# Patient Record
Sex: FEMALE | Race: WHITE | Employment: UNEMPLOYED | URBAN - METROPOLITAN AREA
[De-identification: names, ages, dates, MRNs, and addresses within clinical notes are randomized per-mention and may not be internally consistent; named-entity substitution may affect disease eponyms.]

---

## 2018-02-12 ENCOUNTER — APPOINTMENT (OUTPATIENT)
Dept: GENERAL RADIOLOGY | Age: 83
DRG: 292 | End: 2018-02-12
Payer: MEDICARE

## 2018-02-12 ENCOUNTER — HOSPITAL ENCOUNTER (INPATIENT)
Age: 83
LOS: 2 days | Discharge: HOME | DRG: 292 | End: 2018-02-14
Attending: INTERNAL MEDICINE | Admitting: INTERNAL MEDICINE
Payer: MEDICARE

## 2018-02-12 DIAGNOSIS — R09.02 HYPOXIA: ICD-10-CM

## 2018-02-12 DIAGNOSIS — I50.9 ACUTE CONGESTIVE HEART FAILURE, UNSPECIFIED CONGESTIVE HEART FAILURE TYPE: Primary | ICD-10-CM

## 2018-02-12 PROBLEM — J44.1 COPD WITH ACUTE EXACERBATION (HCC): Status: ACTIVE | Noted: 2018-02-12

## 2018-02-12 PROBLEM — I50.31 ACUTE DIASTOLIC CONGESTIVE HEART FAILURE (HCC): Status: ACTIVE | Noted: 2018-02-12

## 2018-02-12 LAB
ALBUMIN SERPL-MCNC: 3.5 G/DL (ref 3.9–4.9)
ALP BLD-CCNC: 70 U/L (ref 40–130)
ALT SERPL-CCNC: 14 U/L (ref 0–33)
ANION GAP SERPL CALCULATED.3IONS-SCNC: 14 MEQ/L (ref 7–13)
AST SERPL-CCNC: 18 U/L (ref 0–35)
BASOPHILS ABSOLUTE: 0.1 K/UL (ref 0–0.2)
BASOPHILS RELATIVE PERCENT: 0.7 %
BILIRUB SERPL-MCNC: 0.4 MG/DL (ref 0–1.2)
BUN BLDV-MCNC: 26 MG/DL (ref 8–23)
CALCIUM SERPL-MCNC: 9.2 MG/DL (ref 8.6–10.2)
CHLORIDE BLD-SCNC: 97 MEQ/L (ref 98–107)
CO2: 21 MEQ/L (ref 22–29)
CREAT SERPL-MCNC: 1.16 MG/DL (ref 0.5–0.9)
EKG ATRIAL RATE: 73 BPM
EKG P AXIS: 47 DEGREES
EKG P-R INTERVAL: 176 MS
EKG Q-T INTERVAL: 434 MS
EKG QRS DURATION: 132 MS
EKG QTC CALCULATION (BAZETT): 478 MS
EKG R AXIS: 39 DEGREES
EKG T AXIS: 62 DEGREES
EKG VENTRICULAR RATE: 73 BPM
EOSINOPHILS ABSOLUTE: 0.1 K/UL (ref 0–0.7)
EOSINOPHILS RELATIVE PERCENT: 0.9 %
GFR AFRICAN AMERICAN: 52.4
GFR NON-AFRICAN AMERICAN: 43.3
GLOBULIN: 3.1 G/DL (ref 2.3–3.5)
GLUCOSE BLD-MCNC: 118 MG/DL (ref 74–109)
HCT VFR BLD CALC: 40.8 % (ref 37–47)
HEMOGLOBIN: 13.3 G/DL (ref 12–16)
LACTIC ACID: 1.2 MMOL/L (ref 0.5–2.2)
LYMPHOCYTES ABSOLUTE: 1.5 K/UL (ref 1–4.8)
LYMPHOCYTES RELATIVE PERCENT: 19.3 %
MCH RBC QN AUTO: 29.9 PG (ref 27–31.3)
MCHC RBC AUTO-ENTMCNC: 32.5 % (ref 33–37)
MCV RBC AUTO: 92 FL (ref 82–100)
MONOCYTES ABSOLUTE: 0.9 K/UL (ref 0.2–0.8)
MONOCYTES RELATIVE PERCENT: 11.7 %
NEUTROPHILS ABSOLUTE: 5.3 K/UL (ref 1.4–6.5)
NEUTROPHILS RELATIVE PERCENT: 67.4 %
PDW BLD-RTO: 13.5 % (ref 11.5–14.5)
PLATELET # BLD: 248 K/UL (ref 130–400)
POTASSIUM SERPL-SCNC: 5.2 MEQ/L (ref 3.5–5.1)
PRO-BNP: NORMAL PG/ML
RAPID INFLUENZA  B AGN: NEGATIVE
RAPID INFLUENZA A AGN: NEGATIVE
RBC # BLD: 4.44 M/UL (ref 4.2–5.4)
SODIUM BLD-SCNC: 132 MEQ/L (ref 132–144)
TOTAL CK: 36 U/L (ref 0–170)
TOTAL PROTEIN: 6.6 G/DL (ref 6.4–8.1)
TROPONIN: <0.01 NG/ML (ref 0–0.01)
WBC # BLD: 7.9 K/UL (ref 4.8–10.8)

## 2018-02-12 PROCEDURE — 83605 ASSAY OF LACTIC ACID: CPT

## 2018-02-12 PROCEDURE — 99285 EMERGENCY DEPT VISIT HI MDM: CPT

## 2018-02-12 PROCEDURE — 71045 X-RAY EXAM CHEST 1 VIEW: CPT

## 2018-02-12 PROCEDURE — 6360000002 HC RX W HCPCS: Performed by: INTERNAL MEDICINE

## 2018-02-12 PROCEDURE — 6360000002 HC RX W HCPCS: Performed by: NURSE PRACTITIONER

## 2018-02-12 PROCEDURE — 93005 ELECTROCARDIOGRAM TRACING: CPT

## 2018-02-12 PROCEDURE — 87086 URINE CULTURE/COLONY COUNT: CPT

## 2018-02-12 PROCEDURE — 86403 PARTICLE AGGLUT ANTBDY SCRN: CPT

## 2018-02-12 PROCEDURE — 87040 BLOOD CULTURE FOR BACTERIA: CPT

## 2018-02-12 PROCEDURE — 2580000003 HC RX 258: Performed by: INTERNAL MEDICINE

## 2018-02-12 PROCEDURE — 94664 DEMO&/EVAL PT USE INHALER: CPT

## 2018-02-12 PROCEDURE — 80053 COMPREHEN METABOLIC PANEL: CPT

## 2018-02-12 PROCEDURE — 82550 ASSAY OF CK (CPK): CPT

## 2018-02-12 PROCEDURE — 84484 ASSAY OF TROPONIN QUANT: CPT

## 2018-02-12 PROCEDURE — 6370000000 HC RX 637 (ALT 250 FOR IP): Performed by: INTERNAL MEDICINE

## 2018-02-12 PROCEDURE — 36415 COLL VENOUS BLD VENIPUNCTURE: CPT

## 2018-02-12 PROCEDURE — 85025 COMPLETE CBC W/AUTO DIFF WBC: CPT

## 2018-02-12 PROCEDURE — 96374 THER/PROPH/DIAG INJ IV PUSH: CPT

## 2018-02-12 PROCEDURE — 83880 ASSAY OF NATRIURETIC PEPTIDE: CPT

## 2018-02-12 PROCEDURE — 1210000000 HC MED SURG R&B

## 2018-02-12 RX ORDER — LATANOPROST 50 UG/ML
1 SOLUTION/ DROPS OPHTHALMIC NIGHTLY
COMMUNITY

## 2018-02-12 RX ORDER — BRIMONIDINE TARTRATE 2 MG/ML
1 SOLUTION/ DROPS OPHTHALMIC 2 TIMES DAILY
COMMUNITY

## 2018-02-12 RX ORDER — FAMOTIDINE 20 MG/1
20 TABLET, FILM COATED ORAL DAILY
Status: DISCONTINUED | OUTPATIENT
Start: 2018-02-12 | End: 2018-02-14 | Stop reason: HOSPADM

## 2018-02-12 RX ORDER — METHYLPREDNISOLONE SODIUM SUCCINATE 40 MG/ML
10 INJECTION, POWDER, LYOPHILIZED, FOR SOLUTION INTRAMUSCULAR; INTRAVENOUS EVERY 8 HOURS
Status: DISCONTINUED | OUTPATIENT
Start: 2018-02-12 | End: 2018-02-13

## 2018-02-12 RX ORDER — SODIUM CHLORIDE 0.9 % (FLUSH) 0.9 %
10 SYRINGE (ML) INJECTION PRN
Status: DISCONTINUED | OUTPATIENT
Start: 2018-02-12 | End: 2018-02-14 | Stop reason: HOSPADM

## 2018-02-12 RX ORDER — LISINOPRIL 10 MG/1
10 TABLET ORAL DAILY
COMMUNITY
End: 2018-04-10 | Stop reason: SDUPTHER

## 2018-02-12 RX ORDER — IPRATROPIUM BROMIDE AND ALBUTEROL SULFATE 2.5; .5 MG/3ML; MG/3ML
1 SOLUTION RESPIRATORY (INHALATION) 3 TIMES DAILY
Status: DISCONTINUED | OUTPATIENT
Start: 2018-02-12 | End: 2018-02-12

## 2018-02-12 RX ORDER — ALBUTEROL SULFATE 2.5 MG/3ML
2.5 SOLUTION RESPIRATORY (INHALATION)
Status: DISCONTINUED | OUTPATIENT
Start: 2018-02-12 | End: 2018-02-14 | Stop reason: HOSPADM

## 2018-02-12 RX ORDER — CARVEDILOL 12.5 MG/1
12.5 TABLET ORAL 2 TIMES DAILY WITH MEALS
COMMUNITY
End: 2018-04-10 | Stop reason: SDUPTHER

## 2018-02-12 RX ORDER — ACETAMINOPHEN 325 MG/1
650 TABLET ORAL EVERY 4 HOURS PRN
Status: DISCONTINUED | OUTPATIENT
Start: 2018-02-12 | End: 2018-02-14 | Stop reason: HOSPADM

## 2018-02-12 RX ORDER — DOCUSATE SODIUM 100 MG/1
100 CAPSULE, LIQUID FILLED ORAL 2 TIMES DAILY
Status: DISCONTINUED | OUTPATIENT
Start: 2018-02-12 | End: 2018-02-14 | Stop reason: HOSPADM

## 2018-02-12 RX ORDER — CARVEDILOL 12.5 MG/1
12.5 TABLET ORAL 2 TIMES DAILY WITH MEALS
Status: DISCONTINUED | OUTPATIENT
Start: 2018-02-12 | End: 2018-02-14 | Stop reason: HOSPADM

## 2018-02-12 RX ORDER — FUROSEMIDE 10 MG/ML
20 INJECTION INTRAMUSCULAR; INTRAVENOUS 2 TIMES DAILY
Status: DISCONTINUED | OUTPATIENT
Start: 2018-02-12 | End: 2018-02-13

## 2018-02-12 RX ORDER — LISINOPRIL 10 MG/1
10 TABLET ORAL DAILY
Status: DISCONTINUED | OUTPATIENT
Start: 2018-02-12 | End: 2018-02-14 | Stop reason: HOSPADM

## 2018-02-12 RX ORDER — BRIMONIDINE TARTRATE 2 MG/ML
1 SOLUTION/ DROPS OPHTHALMIC 2 TIMES DAILY
Status: DISCONTINUED | OUTPATIENT
Start: 2018-02-12 | End: 2018-02-14 | Stop reason: HOSPADM

## 2018-02-12 RX ORDER — IPRATROPIUM BROMIDE AND ALBUTEROL SULFATE 2.5; .5 MG/3ML; MG/3ML
1 SOLUTION RESPIRATORY (INHALATION) EVERY 4 HOURS PRN
Status: DISCONTINUED | OUTPATIENT
Start: 2018-02-12 | End: 2018-02-14 | Stop reason: HOSPADM

## 2018-02-12 RX ORDER — SODIUM CHLORIDE 0.9 % (FLUSH) 0.9 %
10 SYRINGE (ML) INJECTION EVERY 12 HOURS SCHEDULED
Status: DISCONTINUED | OUTPATIENT
Start: 2018-02-12 | End: 2018-02-14 | Stop reason: HOSPADM

## 2018-02-12 RX ORDER — ONDANSETRON 2 MG/ML
4 INJECTION INTRAMUSCULAR; INTRAVENOUS EVERY 6 HOURS PRN
Status: DISCONTINUED | OUTPATIENT
Start: 2018-02-12 | End: 2018-02-14 | Stop reason: HOSPADM

## 2018-02-12 RX ORDER — LATANOPROST 50 UG/ML
1 SOLUTION/ DROPS OPHTHALMIC NIGHTLY
Status: DISCONTINUED | OUTPATIENT
Start: 2018-02-12 | End: 2018-02-14 | Stop reason: HOSPADM

## 2018-02-12 RX ORDER — FUROSEMIDE 10 MG/ML
20 INJECTION INTRAMUSCULAR; INTRAVENOUS ONCE
Status: COMPLETED | OUTPATIENT
Start: 2018-02-12 | End: 2018-02-12

## 2018-02-12 RX ORDER — HYDRALAZINE HYDROCHLORIDE 20 MG/ML
20 INJECTION INTRAMUSCULAR; INTRAVENOUS EVERY 4 HOURS PRN
Status: DISCONTINUED | OUTPATIENT
Start: 2018-02-12 | End: 2018-02-14 | Stop reason: HOSPADM

## 2018-02-12 RX ADMIN — FUROSEMIDE 20 MG: 10 INJECTION, SOLUTION INTRAVENOUS at 18:14

## 2018-02-12 RX ADMIN — CARVEDILOL 12.5 MG: 12.5 TABLET, FILM COATED ORAL at 23:29

## 2018-02-12 RX ADMIN — Medication 10 ML: at 23:30

## 2018-02-12 RX ADMIN — ENOXAPARIN SODIUM 30 MG: 40 INJECTION SUBCUTANEOUS at 23:36

## 2018-02-12 RX ADMIN — DOCUSATE SODIUM 100 MG: 100 CAPSULE, LIQUID FILLED ORAL at 23:29

## 2018-02-12 RX ADMIN — METHYLPREDNISOLONE SODIUM SUCCINATE 10 MG: 40 INJECTION, POWDER, FOR SOLUTION INTRAMUSCULAR; INTRAVENOUS at 23:28

## 2018-02-12 RX ADMIN — FAMOTIDINE 20 MG: 20 TABLET, FILM COATED ORAL at 23:29

## 2018-02-12 ASSESSMENT — ENCOUNTER SYMPTOMS
COUGH: 0
ABDOMINAL PAIN: 0
SHORTNESS OF BREATH: 1
NAUSEA: 0
VOMITING: 0
DIARRHEA: 0

## 2018-02-12 NOTE — ED PROVIDER NOTES
[address] [facility] ED  eMERGENCY dEPARTMENT eNCOUnter      Pt Name: Sharron Lambert  MRN: [id number]  Rejigfluz 9/22/1921  Date of evaluation: 2/12/2018  Provider: Ashly Taylor [id number]       Chief Complaint   Patient presents with    Shortness of Breath     x1 week         HISTORY OF PRESENT ILLNESS   (Location/Symptom, Timing/Onset, Context/Setting, Quality, Duration, Modifying Factors, Severity)  Note limiting factors. Sharron Lambert is a 80 y.o. female who presents to the emergency department With complaints of shortness of breath. Shortness of breath has been progressively worsening over the course of the last week. Patient does have dyspnea on exertion. She denies any chest pain cough or congestion. She hasn't had no associated headache dizziness lightheadedness fever sweats or chills nausea vomiting diarrhea constipation or abdominal pain. Her shortness of breath improves with rest.  She denies any leg swelling or palpitations. She does present by EMS and was found to have a low pulse oxygenation at 86% on room air. Nursing Notes were reviewed. REVIEW OF SYSTEMS    (2-9 systems for level 4, 10 or more for level 5)     Review of Systems   Constitutional: Negative for chills, diaphoresis, fatigue and fever. HENT: Negative for congestion. Respiratory: Positive for shortness of breath. Negative for cough. Cardiovascular: Negative for chest pain and palpitations. Gastrointestinal: Negative for abdominal pain, diarrhea, nausea and vomiting. Genitourinary: Negative for dysuria and flank pain. Skin: Negative for rash. Neurological: Negative for dizziness, light-headedness and headaches. Except as noted above the remainder of the review of systems was reviewed and negative.        PAST MEDICAL HISTORY     Past Medical History:   Diagnosis Date    Arthritis     Glaucoma     Hyperlipidemia     Hypertension     Macula lutea degeneration      Past Surgical History:   Procedure Laterality Date    APPENDECTOMY      HYSTERECTOMY      JOINT REPLACEMENT      SHOULDER SURGERY  2013     Social History     Social History    Marital status:      Spouse name: N/A    Number of children: N/A    Years of education: N/A     Social History Main Topics    Smoking status: Never Smoker    Smokeless tobacco: Never Used    Alcohol use No    Drug use: No    Sexual activity: Not Asked     Other Topics Concern    None     Social History Narrative    None       SCREENINGS             PHYSICAL EXAM    (up to 7 for level 4, 8 or more for level 5)     ED Triage Vitals [02/12/18 1505]   BP Temp Temp Source Pulse Resp SpO2 Height Weight   (!) 191/127 98.7 °F (37.1 °C) Temporal 74 26 96 % 5' 4\" (1.626 m) 150 lb (68 kg)       Physical Exam   Constitutional: She is oriented to person, place, and time. She appears well-developed and well-nourished. She is active. No distress. HENT:   Head: Normocephalic and atraumatic. Mouth/Throat: Mucous membranes are normal.   Neck: Normal range of motion. Neck supple. Cardiovascular: Normal rate, regular rhythm, normal heart sounds, intact distal pulses and normal pulses. Pulmonary/Chest: Effort normal. She has rales. Very faint rails to bilateral bases. No rhonchi rales or wheezes. No conversational dyspnea stridor or grunting or accessory muscle usage. Abdominal: Soft. Normal appearance and bowel sounds are normal. There is no tenderness. Neurological: She is alert and oriented to person, place, and time. She has normal strength. Skin: Skin is warm, dry and intact. No rash noted. She is not diaphoretic. Nursing note and vitals reviewed. DIAGNOSTIC RESULTS     EKG: All EKG's are interpreted by the Emergency Department Physician who either signs or Co-signs this chart in the absence of a cardiologist.    Sinus arrhythmia with a left bundle-branch block. No acute ST segment elevation.     RADIOLOGY:   Non-plain film Height: 5' 4\" (1.626 m)            ED Course      MDM patient was reexamined upon her return from the restroom. She is obviously very dyspneic, tachycardic and hypoxic. She is using accessory muscles. Off of room air she is 84%. When the oxygen was replaced she quickly rebounded to 97% with O2 use. Patient was reevaluated multiple times after this episode of dyspnea on exertion. She does return to a non-labored respirations status. Lasix was ordered as the patient's BNP was significantly elevated at 21,000+. Her chest x-ray did not demonstrate further or other acute pathology however was limited due to poor inspiration. The patient's troponin was negative. Patient will be admitted to the hospital for further evaluation and management of CHF due to the hypoxia. Patient is agreeable to this plan. She will be admitted and transition to the floor showing no signs of symptoms of distress or decompensation. CRITICAL CARE TIME       CONSULTS:  IP CONSULT TO HOSPITALIST    PROCEDURES:  Unless otherwise noted below, none     Procedures    FINAL IMPRESSION      1. Acute congestive heart failure, unspecified congestive heart failure type ([address]    2. Hypoxia          DISPOSITION/PLAN   DISPOSITION Decision To Admit 02/12/2018 05:31:45 PM      PATIENT REFERRED TO:  No follow-up provider specified.     DISCHARGE MEDICATIONS:  New Prescriptions    No medications on file          (Please note that portions of this note were completed with a voice recognition program.  Efforts were made to edit the dictations but occasionally words are mis-transcribed.)    Jeff Trujillo CNP (electronically signed)  Attending Emergency Physician         Jeff Trujillo [place]  02/12/18 [address], [place]  02/12/18 4997

## 2018-02-12 NOTE — ED NOTES
Patient resting in bed at this time, no distress noted. Family remains at bedside. Patient states she feels as if she can go home and asking if her results are back so she can return home. Patient laughing and joking with family.       Leonard Olivier RN  02/12/18 [id number]

## 2018-02-12 NOTE — ED NOTES
IV access obtained at this time with a 22 gauge angiocath in left wrist at this time after 1 unsuccessful attempt. Patient tolerated well.        Manuela Feldman RN  02/12/18 [id number]

## 2018-02-12 NOTE — ED NOTES
Patient resting in bed at this time, no distress noted. Family remains at bedside. Patient has no complaints at this time.       Clarisse Stanley RN  02/12/18 7220

## 2018-02-13 LAB
ALBUMIN SERPL-MCNC: 3.7 G/DL (ref 3.9–4.9)
ALP BLD-CCNC: 78 U/L (ref 40–130)
ALT SERPL-CCNC: 15 U/L (ref 0–33)
ANION GAP SERPL CALCULATED.3IONS-SCNC: 17 MEQ/L (ref 7–13)
AST SERPL-CCNC: 19 U/L (ref 0–35)
BACTERIA: ABNORMAL /HPF
BILIRUB SERPL-MCNC: 0.4 MG/DL (ref 0–1.2)
BILIRUBIN URINE: NEGATIVE
BLOOD, URINE: NEGATIVE
BUN BLDV-MCNC: 27 MG/DL (ref 8–23)
CALCIUM SERPL-MCNC: 9.1 MG/DL (ref 8.6–10.2)
CHLORIDE BLD-SCNC: 97 MEQ/L (ref 98–107)
CLARITY: ABNORMAL
CO2: 20 MEQ/L (ref 22–29)
COLOR: YELLOW
CREAT SERPL-MCNC: 1.25 MG/DL (ref 0.5–0.9)
EPITHELIAL CELLS, UA: ABNORMAL /HPF
GFR AFRICAN AMERICAN: 48
GFR NON-AFRICAN AMERICAN: 39.7
GLOBULIN: 3.1 G/DL (ref 2.3–3.5)
GLUCOSE BLD-MCNC: 153 MG/DL (ref 74–109)
GLUCOSE URINE: NEGATIVE MG/DL
HCT VFR BLD CALC: 41.5 % (ref 37–47)
HEMOGLOBIN: 13.6 G/DL (ref 12–16)
KETONES, URINE: NEGATIVE MG/DL
LACTIC ACID: 1.4 MMOL/L (ref 0.5–2.2)
LEUKOCYTE ESTERASE, URINE: ABNORMAL
LV EF: 50 %
LVEF MODALITY: NORMAL
MAGNESIUM: 2 MG/DL (ref 1.7–2.3)
MCH RBC QN AUTO: 30.3 PG (ref 27–31.3)
MCHC RBC AUTO-ENTMCNC: 32.8 % (ref 33–37)
MCV RBC AUTO: 92.6 FL (ref 82–100)
NITRITE, URINE: NEGATIVE
PDW BLD-RTO: 13.7 % (ref 11.5–14.5)
PH UA: 6.5 (ref 5–9)
PLATELET # BLD: 252 K/UL (ref 130–400)
POTASSIUM REFLEX MAGNESIUM: 5.3 MEQ/L (ref 3.5–5.1)
PROTEIN UA: NEGATIVE MG/DL
RBC # BLD: 4.49 M/UL (ref 4.2–5.4)
RBC UA: ABNORMAL /HPF (ref 0–2)
SODIUM BLD-SCNC: 134 MEQ/L (ref 132–144)
SPECIFIC GRAVITY UA: 1.01 (ref 1–1.03)
TOTAL PROTEIN: 6.8 G/DL (ref 6.4–8.1)
URINE REFLEX TO CULTURE: YES
UROBILINOGEN, URINE: 0.2 E.U./DL
WBC # BLD: 5.4 K/UL (ref 4.8–10.8)
WBC UA: ABNORMAL /HPF (ref 0–5)

## 2018-02-13 PROCEDURE — 2580000003 HC RX 258: Performed by: INTERNAL MEDICINE

## 2018-02-13 PROCEDURE — 2700000000 HC OXYGEN THERAPY PER DAY

## 2018-02-13 PROCEDURE — 1210000000 HC MED SURG R&B

## 2018-02-13 PROCEDURE — 97162 PT EVAL MOD COMPLEX 30 MIN: CPT

## 2018-02-13 PROCEDURE — 36415 COLL VENOUS BLD VENIPUNCTURE: CPT

## 2018-02-13 PROCEDURE — G8979 MOBILITY GOAL STATUS: HCPCS

## 2018-02-13 PROCEDURE — 93306 TTE W/DOPPLER COMPLETE: CPT

## 2018-02-13 PROCEDURE — 83605 ASSAY OF LACTIC ACID: CPT

## 2018-02-13 PROCEDURE — 80053 COMPREHEN METABOLIC PANEL: CPT

## 2018-02-13 PROCEDURE — G8978 MOBILITY CURRENT STATUS: HCPCS

## 2018-02-13 PROCEDURE — 99222 1ST HOSP IP/OBS MODERATE 55: CPT | Performed by: INTERNAL MEDICINE

## 2018-02-13 PROCEDURE — 2500000003 HC RX 250 WO HCPCS: Performed by: INTERNAL MEDICINE

## 2018-02-13 PROCEDURE — 83735 ASSAY OF MAGNESIUM: CPT

## 2018-02-13 PROCEDURE — 6370000000 HC RX 637 (ALT 250 FOR IP): Performed by: INTERNAL MEDICINE

## 2018-02-13 PROCEDURE — P9046 ALBUMIN (HUMAN), 25%, 20 ML: HCPCS | Performed by: NURSE PRACTITIONER

## 2018-02-13 PROCEDURE — 6360000002 HC RX W HCPCS: Performed by: INTERNAL MEDICINE

## 2018-02-13 PROCEDURE — 6360000002 HC RX W HCPCS: Performed by: NURSE PRACTITIONER

## 2018-02-13 PROCEDURE — 97116 GAIT TRAINING THERAPY: CPT

## 2018-02-13 PROCEDURE — 81001 URINALYSIS AUTO W/SCOPE: CPT

## 2018-02-13 PROCEDURE — 85027 COMPLETE CBC AUTOMATED: CPT

## 2018-02-13 RX ORDER — FUROSEMIDE 10 MG/ML
40 INJECTION INTRAMUSCULAR; INTRAVENOUS 2 TIMES DAILY
Status: DISCONTINUED | OUTPATIENT
Start: 2018-02-13 | End: 2018-02-13

## 2018-02-13 RX ORDER — ALBUMIN (HUMAN) 12.5 G/50ML
25 SOLUTION INTRAVENOUS EVERY 8 HOURS
Status: COMPLETED | OUTPATIENT
Start: 2018-02-13 | End: 2018-02-13

## 2018-02-13 RX ORDER — FUROSEMIDE 10 MG/ML
20 INJECTION INTRAMUSCULAR; INTRAVENOUS EVERY 8 HOURS
Status: DISCONTINUED | OUTPATIENT
Start: 2018-02-13 | End: 2018-02-14

## 2018-02-13 RX ADMIN — CARVEDILOL 12.5 MG: 12.5 TABLET, FILM COATED ORAL at 17:49

## 2018-02-13 RX ADMIN — LISINOPRIL 10 MG: 10 TABLET ORAL at 10:13

## 2018-02-13 RX ADMIN — Medication 10 ML: at 10:13

## 2018-02-13 RX ADMIN — ALBUMIN (HUMAN) 25 G: 0.25 INJECTION, SOLUTION INTRAVENOUS at 10:30

## 2018-02-13 RX ADMIN — CARVEDILOL 12.5 MG: 12.5 TABLET, FILM COATED ORAL at 10:12

## 2018-02-13 RX ADMIN — BRIMONIDINE TARTRATE 1 DROP: 2 SOLUTION OPHTHALMIC at 10:13

## 2018-02-13 RX ADMIN — ALBUMIN (HUMAN) 25 G: 0.25 INJECTION, SOLUTION INTRAVENOUS at 17:59

## 2018-02-13 RX ADMIN — CEFTRIAXONE SODIUM 1 G: 10 INJECTION, POWDER, FOR SOLUTION INTRAVENOUS at 00:09

## 2018-02-13 RX ADMIN — FUROSEMIDE 20 MG: 10 INJECTION, SOLUTION INTRAVENOUS at 17:49

## 2018-02-13 RX ADMIN — FUROSEMIDE 20 MG: 10 INJECTION, SOLUTION INTRAVENOUS at 10:26

## 2018-02-13 RX ADMIN — Medication 10 ML: at 21:50

## 2018-02-13 RX ADMIN — LATANOPROST 1 DROP: 50 SOLUTION OPHTHALMIC at 21:46

## 2018-02-13 RX ADMIN — DOCUSATE SODIUM 100 MG: 100 CAPSULE, LIQUID FILLED ORAL at 10:13

## 2018-02-13 RX ADMIN — DOCUSATE SODIUM 100 MG: 100 CAPSULE, LIQUID FILLED ORAL at 21:46

## 2018-02-13 RX ADMIN — ENOXAPARIN SODIUM 30 MG: 40 INJECTION SUBCUTANEOUS at 10:17

## 2018-02-13 RX ADMIN — BRIMONIDINE TARTRATE 1 DROP: 2 SOLUTION OPHTHALMIC at 21:46

## 2018-02-13 RX ADMIN — METHYLPREDNISOLONE SODIUM SUCCINATE 10 MG: 40 INJECTION, POWDER, FOR SOLUTION INTRAMUSCULAR; INTRAVENOUS at 10:12

## 2018-02-13 ASSESSMENT — PAIN SCALES - GENERAL: PAINLEVEL_OUTOF10: 0

## 2018-02-13 ASSESSMENT — ENCOUNTER SYMPTOMS
STRIDOR: 0
ALLERGIC/IMMUNOLOGIC NEGATIVE: 1
GASTROINTESTINAL NEGATIVE: 1
CHOKING: 0
CHEST TIGHTNESS: 0
SHORTNESS OF BREATH: 1
APNEA: 0
WHEEZING: 0
EYES NEGATIVE: 1
COUGH: 1

## 2018-02-13 NOTE — PROGRESS NOTES
Physical Therapy Med Surg Initial Assessment  Fac[facility]/Department[facility]  [facility]       NAME: Jaida Kingston  : 1921 (80 y.o.)  MRN: [id number]  CODE STATUS: DNR-CC    Date of Service: 2018    Patient Diagnosis(es): COPD with acute exacerbation [facility]) [J44.1]   Chief Complaint   Patient presents with    Shortness of Breath     x1 week     Patient Active Problem List    Diagnosis Date Noted    COPD with acute exacerbation ([facility] 2018    Acute diastolic congestive heart failure ([facility] 2018    HTN (hypertension) 2015        Past Medical History:   Diagnosis Date    Arthritis     Glaucoma     Hyperlipidemia     Hypertension     Macula lutea degeneration      Past Surgical History:   Procedure Laterality Date    APPENDECTOMY      HYSTERECTOMY      JOINT REPLACEMENT      SHOULDER SURGERY         Chart Reviewed: Yes  Patient assessed for rehabilitation services?: Yes  Family / Caregiver Present: Yes (dtr)    Restrictions:  Restrictions/Precautions: Fall Risk  Body mass index is 25.75 kg/m². SUBJECTIVE: Subjective: Pt states that she has participated in out pt PT services in the past.       Post Treatment Pain Screening:   Pain Screening  Patient Currently in Pain: No    Prior Level of Function:  Social/Functional History per pt report  Lives With:  sister  Type of Home: House  Home Layout: split level (5 steps up to bedroom bathroom with B HR)  Home Access: Stairs to enter with rails  Entrance Stairs - Number of Steps: 5  Entrance Stairs - Rails: Both  Bathroom Shower/Tub: Walk-in shower  Bathroom Equipment: Grab bars in shower, Shower chair  Home Equipment: 4 wheeled walker, [employer] Help From: Family PRN (dtr)  ADL Assistance: [address]: Independent  Homemaking Responsibilities: Yes  Ambulation Assistance: Independent (with 2ww)  Transfer Assistance: Independent  Active : Yes.  \"When the weather is nice.\"    OBJECTIVE:   Vision/Hearing:  Vision: Within Functional Limits  Hearing: Exceptions to [facility]  Hearing Exceptions: No hearing aid;Hard of hearing/hearing concerns    Cognition:  Overall Orientation Status: Within Normal Limits  Follows Commands: Within Functional Limits    Observation/Palpation  Observation: no acute distress note    ROM:  RLE AROM: WFL  LLE AROM : WFL    Strength:  Strength RLE  Comment: >3+/5 functionally assessed  Strength LLE  Comment: >3+/5 functionally assessed  Strength Other  Other: core 3+/5 functionally assessed    Neuro:  Balance  Posture: Fair  Sitting - Static: Good  Sitting - Dynamic: Good  Standing - Static: Fair;+ (stands with no UE support and manipulate pants for toileting)  Standing - Dynamic: Fair     Motor Control  Gross Motor?: WFL  Sensation  Overall Sensation Status: WFL    Bed mobility  Supine to Sit: Stand by assistance  Sit to Supine: Stand by assistance    Transfers  Sit to Stand: Stand by assistance  Stand to sit: Stand by assistance    Ambulation  Ambulation?: Yes  Ambulation 1  Surface: level tile  Device: Rolling Walker  Assistance: Stand by assistance  Quality of Gait: flexed posture, decreased gait speed  Distance: 20ft   Comments: no LOB, SOB with exertion    Activity Tolerance  Activity Tolerance: Patient limited by endurance  Marching in place with 2ww x50 seconds before requiring standing rest break        ASSESSMENT:   Body structures, Functions, Activity limitations: Decreased functional mobility ; Decreased endurance;Decreased strength;Decreased balance  Decision Making: Medium Complexity    Prognosis: Good  Patient Education: Pt educated in role of acute care PT, PT POC, benefits of mobility while in house, safety techniques, use of call light for assistance, d/c planning, d/c recommendation.     Barriers to Learning: none    DISCHARGE RECOMMENDATIONS:  Discharge Recommendations: Continue to assess pending progress    Assessment: Pt demonstrates the

## 2018-02-13 NOTE — H&P
[address]                       [address], [address] [facility]                               HISTORY AND PHYSICAL    PATIENT NAME: Rajani Jasso                       :        1921  MED REC NO:   [id number]                            ROOM:       [id number]  ACCOUNT NO:   [de-identified]                           ADMIT DATE: 2018  PROVIDER:     Lashawn Moffett MD    REASON FOR ADMISSION:  Congestive heart failure. HISTORY OF PRESENT ILLNESS:  The patient is a 80year-old patient under the  care of Dr. Bethany Ureña. The patient came in to the emergency room after  experiencing significant shortness of breath. The patient was evaluated here by  _____ and admitted with a diagnosis  of congestive heart failure. PAST MEDICAL HISTORY:  1. Hypertension. 2.  Arthritis. 3.  Osteoporosis. 4.  Femoral hip fracture. MEDICATIONS AT HOME:  Include Coreg, Cipro, and Zestril. ALLERGIES:  MORPHINE, OXYCODONE, SULFA, and TRAMADOL. SURGERIES:  Cataract removal, hysteroscopy, and shoulder replacement. FAMILY HISTORY:  There are no major problems reported. REVIEW OF SYSTEMS:  The patient is feeling much better now and wants to go  home. Denies any chest pain. Denies any nausea or vomiting. Denies any  fever or chills. Denies any cough or phlegm. Denies any abdominal pain. Denies any constipation or diarrhea. The patient states the only problem  was shortness of breath yesterday which has resolved completely now. PHYSICAL EXAMINATION:  GENERAL:   The patient is alert and awake, not in acute distress at this  time. VITAL SIGNS:  Blood pressure was 150/73, pulse rate 74, respiratory rate  16, temperature ______. CS:  2+.  RS:  Generally clear. ABDOMEN:  Soft. CENTRAL NERVOUS SYSTEM:  Shows no obvious focal deficits.     LABORATORY DATA:  Review of labs so far, sodium 134, potassium 5.3, BUN 27,  creatinine 1.2, albumin is 2.7, white cell count 5.3, hemoglobin _____,  platelets 855,752. Urinalysis showed moderate amount of leukocyte esterases. Chest x-ray showed density in the left lung base. EKG did not show any evidence of acute ischemic changes. Left bundle-branch block was noted. DISCUSSION:  1. Acute congestive heart failure. Most likely diastolic, await 2D  echocardiogram.  Appreciate inputs from Cardiology. Continue with Lasix  but at a lower dose. 2.  Hypertension. 3.  _____ fracture. Overall, the patient is stable. We will continue to monitor and follow the  patient up in the morning. Case discussed at length with the patient's  family.         Gee Jacome MD    D: 02/13/2018 12:25:17       T: 02/13/2018 15:20:08     [email or web address]  Job#: [id number]     Doc#: [id number]    CC:

## 2018-02-13 NOTE — PROGRESS NOTES
Pt assessment was completed. Pt resting comfortably in bed and knows to call for assistance to the bathroom. Legs edematous and lungs crackles. Bedside commode used for SOB. No concerns at this time. Will continue to monitor.

## 2018-02-13 NOTE — CONSULTS
Systems:   Review of Systems   Constitutional: Positive for fatigue. Negative for appetite change, chills, diaphoresis and fever. HENT: Negative. Eyes: Negative. Respiratory: Positive for cough and shortness of breath. Negative for apnea, choking, chest tightness, wheezing and stridor. Cardiovascular: Positive for leg swelling. Negative for chest pain and palpitations. Gastrointestinal: Negative. Endocrine: Negative. Genitourinary: Negative. Musculoskeletal: Negative. Allergic/Immunologic: Negative. Neurological: Negative. Hematological: Negative. Psychiatric/Behavioral: Negative. Physical Examination:    BP (!) 152/73   Pulse 64   Temp 97.3 °F (36.3 °C) (Oral)   Resp 16   Ht 5' 4\" (1.626 m)   Wt 150 lb (68 kg)   SpO2 96%   BMI 25.75 kg/m²    Physical Exam   Constitutional: She is oriented to person, place, and time. She appears well-developed and well-nourished. HENT:   Head: Normocephalic. Eyes: Pupils are equal, round, and reactive to light. Neck: No JVD present. No tracheal deviation present. No thyromegaly present. Cardiovascular: Normal rate, regular rhythm and intact distal pulses. Exam reveals no gallop and no friction rub. Murmur heard. Pulmonary/Chest: Effort normal. No respiratory distress. She has no wheezes. She has rales. She exhibits no tenderness. Abdominal: Soft. Bowel sounds are normal.   Musculoskeletal: Normal range of motion. She exhibits edema. She exhibits no tenderness. Lymphadenopathy:     She has no cervical adenopathy. Neurological: She is alert and oriented to person, place, and time. Skin: Skin is warm and dry. Psychiatric: She has a normal mood and affect.        LABS:  CBC:   Lab Results   Component Value Date    WBC 5.4 02/13/2018    RBC 4.49 02/13/2018    HGB 13.6 02/13/2018    HCT 41.5 02/13/2018    MCV 92.6 02/13/2018    MCH 30.3 02/13/2018    MCHC 32.8 02/13/2018    RDW 13.7 02/13/2018     02/13/2018 greater than 4.0  6. Plan for discharge home tomorrow  7. Medical management only from cardiac standpoint      Attending Supervising Physicians Attestation Statement  I was present with the nurse practitioner during the history and exam. I discussed the findings and plans with the nurse practitioner and agree as documented in his note      Patient with dyspnea and history of COPD. Clinically, however has CHF decompensation, presumed to be diastolic. Will check ECHO and diuresis, afterload reduction.       Electronically signed by Marie Shaw MD on 2/13/18 at 7:30 PM            Electronically signed by Jacqueline Page NP on 2/13/2018 at 10:14 AM

## 2018-02-13 NOTE — CARE COORDINATION
Met with pt and daughter at bedside. Pt reports she is from home with her sister and will return. She uses a walker and no home 02. Per PT greg and their recommendation, she would like DC with [facility] for nursing and therapy. Daughter also provides assistance. They report pt does have 2 flights of stairs into apartment, but they are not concerned about this.

## 2018-02-14 VITALS
DIASTOLIC BLOOD PRESSURE: 63 MMHG | HEART RATE: 60 BPM | SYSTOLIC BLOOD PRESSURE: 103 MMHG | BODY MASS INDEX: 25.61 KG/M2 | WEIGHT: 150 LBS | OXYGEN SATURATION: 100 % | HEIGHT: 64 IN | RESPIRATION RATE: 18 BRPM | TEMPERATURE: 96.9 F

## 2018-02-14 LAB — MAGNESIUM: 2.3 MG/DL (ref 1.7–2.3)

## 2018-02-14 PROCEDURE — G8988 SELF CARE GOAL STATUS: HCPCS

## 2018-02-14 PROCEDURE — 97166 OT EVAL MOD COMPLEX 45 MIN: CPT

## 2018-02-14 PROCEDURE — 2500000003 HC RX 250 WO HCPCS: Performed by: INTERNAL MEDICINE

## 2018-02-14 PROCEDURE — 6370000000 HC RX 637 (ALT 250 FOR IP): Performed by: INTERNAL MEDICINE

## 2018-02-14 PROCEDURE — 6360000002 HC RX W HCPCS: Performed by: INTERNAL MEDICINE

## 2018-02-14 PROCEDURE — 99232 SBSQ HOSP IP/OBS MODERATE 35: CPT | Performed by: INTERNAL MEDICINE

## 2018-02-14 PROCEDURE — 6370000000 HC RX 637 (ALT 250 FOR IP): Performed by: NURSE PRACTITIONER

## 2018-02-14 PROCEDURE — 36415 COLL VENOUS BLD VENIPUNCTURE: CPT

## 2018-02-14 PROCEDURE — G8987 SELF CARE CURRENT STATUS: HCPCS

## 2018-02-14 PROCEDURE — 6360000002 HC RX W HCPCS: Performed by: NURSE PRACTITIONER

## 2018-02-14 PROCEDURE — 2700000000 HC OXYGEN THERAPY PER DAY

## 2018-02-14 PROCEDURE — 97116 GAIT TRAINING THERAPY: CPT

## 2018-02-14 PROCEDURE — 83735 ASSAY OF MAGNESIUM: CPT

## 2018-02-14 RX ORDER — FUROSEMIDE 20 MG/1
20 TABLET ORAL 2 TIMES DAILY
Status: DISCONTINUED | OUTPATIENT
Start: 2018-02-14 | End: 2018-02-14

## 2018-02-14 RX ORDER — FUROSEMIDE 20 MG/1
20 TABLET ORAL DAILY
Qty: 60 TABLET | Refills: 3 | Status: SHIPPED | OUTPATIENT
Start: 2018-02-15 | End: 2018-03-06 | Stop reason: DRUGHIGH

## 2018-02-14 RX ORDER — FUROSEMIDE 20 MG/1
20 TABLET ORAL DAILY
Status: DISCONTINUED | OUTPATIENT
Start: 2018-02-15 | End: 2018-02-14

## 2018-02-14 RX ORDER — FUROSEMIDE 20 MG/1
20 TABLET ORAL DAILY
Status: DISCONTINUED | OUTPATIENT
Start: 2018-02-14 | End: 2018-02-14 | Stop reason: HOSPADM

## 2018-02-14 RX ORDER — FUROSEMIDE 20 MG/1
20 TABLET ORAL DAILY
Status: DISCONTINUED | OUTPATIENT
Start: 2018-02-14 | End: 2018-02-14

## 2018-02-14 RX ORDER — FUROSEMIDE 20 MG/1
20 TABLET ORAL 2 TIMES DAILY
Qty: 60 TABLET | Refills: 3 | Status: SHIPPED | OUTPATIENT
Start: 2018-02-14 | End: 2018-02-14 | Stop reason: HOSPADM

## 2018-02-14 RX ADMIN — FUROSEMIDE 20 MG: 10 INJECTION, SOLUTION INTRAVENOUS at 02:23

## 2018-02-14 RX ADMIN — CARVEDILOL 12.5 MG: 12.5 TABLET, FILM COATED ORAL at 10:34

## 2018-02-14 RX ADMIN — LISINOPRIL 10 MG: 10 TABLET ORAL at 10:34

## 2018-02-14 RX ADMIN — DOCUSATE SODIUM 100 MG: 100 CAPSULE, LIQUID FILLED ORAL at 10:34

## 2018-02-14 RX ADMIN — BRIMONIDINE TARTRATE 1 DROP: 2 SOLUTION OPHTHALMIC at 10:34

## 2018-02-14 RX ADMIN — CEFTRIAXONE SODIUM 1 G: 10 INJECTION, POWDER, FOR SOLUTION INTRAVENOUS at 00:17

## 2018-02-14 RX ADMIN — FUROSEMIDE 20 MG: 20 TABLET ORAL at 10:34

## 2018-02-14 RX ADMIN — ENOXAPARIN SODIUM 30 MG: 40 INJECTION SUBCUTANEOUS at 10:34

## 2018-02-14 NOTE — HOME CARE
Called pt's house and her sister answered. Pt not home yet. Called dtr Rocio Cobos cell phone as pt has no cell and nurse stated dtr was here and wanted info on the [facility]. They are at the drugstore getting RX. dtr Norman Schuster stated mom was in the car but they definitely want home care and want [address] home care when choice offered. Verified home bound status with dtr. Routed transfer form to [facility] intake and emailed Carolina Goss referral infor and dtr phone number to be called to arrange soc. Also notified Lola Lomas in [facility] intake of same.

## 2018-02-14 NOTE — DISCHARGE SUMMARY
weakness  Skin: Warm and dry. No erythema rash on exposed extremities. Lungs are clear at this time. DATA:   Recent Labs      02/12/18   1515  02/13/18   0612   WBC  7.9  5.4   HGB  13.3  13.6   HCT  40.8  41.5   MCV  92.0  92.6   PLT  248  252     Recent Labs      02/12/18   1515  02/13/18   0612   NA  132  134   K  5.2*  5.3*   CL  97*  97*   CO2  21*  20*   BUN  26*  27*   CREATININE  1.16*  1.25*   GLUCOSE  118*  153*   CALCIUM  9.2  9.1   PROT  6.6  6.8   LABALBU  3.5*  3.7*   BILITOT  0.4  0.4   ALKPHOS  70  78   AST  18  19   ALT  14  15   LABGLOM  43.3*  39.7*   GFRAA  52.4*  48.0*   GLOB  3.1  3.1         No results for input(s): PHART, WZY2AGQ, PO2ART, DKY7XAW, BEART, J4DRERZO in the last 72 hours. O2 Device: Nasal cannula  O2 Flow Rate (L/min): 3 L/min  Lab Results   Component Value Date    LACTA 1.2 02/12/2018        MEDICATIONS during current hospitalization:    Continuous Infusions:     Scheduled Meds:   furosemide  20 mg Oral Daily    brimonidine  1 drop Both Eyes BID    carvedilol  12.5 mg Oral BID WC    latanoprost  1 drop Both Eyes Nightly    lisinopril  10 mg Oral Daily    sodium chloride flush  10 mL Intravenous 2 times per day    docusate sodium  100 mg Oral BID    famotidine  20 mg Oral Daily    enoxaparin  30 mg Subcutaneous Daily    cefTRIAXone (ROCEPHIN) IV  1 g Intravenous Q24H       PRN Meds:sodium chloride flush, acetaminophen, magnesium hydroxide, ondansetron, hydrALAZINE, albuterol, ipratropium-albuterol    Radiology  Xr Chest Portable    Result Date: 2/12/2018  EXAMINATION: XR CHEST PORTABLE, 2/12/2018 3:15 PM History: 80year-old female, shortness of breath COMPARISON:  October 29, 2013 FINDINGS:  Study limited by patient kyphosis. Patient's chin overlaps the lung apices, somewhat limiting the evaluation. There are chronic interstitial changes. Ill-defined patchy density in the left lateral lung base with smoothly marginated superior border.  The costophrenic angles

## 2018-02-14 NOTE — CARE COORDINATION
Care Transition Nurse provided COPD and CHF booklets and zones sheets and reviewed at length with patient and her daughter. Discussed the importance of following a low sodium diet, how to read food labels for sodium content, keep total daily sodium intake to no more than 2000 mg. Phone Cardiologist for sob, edema of lower extremities or abdomen, weight gain of 3 or more pounds in 1-7 days. Stressed importance of phoning physician promptly for symptoms in the yellow zone and phoning ems for symptoms in the red zone. Patient will be followed by home health care.

## 2018-02-14 NOTE — CONSULTS
2/12/2018 3:15 PM History: 80year-old female, shortness of breath COMPARISON:  October 29, 2013 FINDINGS:  Study limited by patient kyphosis. Patient's chin overlaps the lung apices, somewhat limiting the evaluation. There are chronic interstitial changes. Ill-defined patchy density in the left lateral lung base with smoothly marginated superior border. The costophrenic angles are clear. The cardiac silhouette is normal in size. The pulmonary vascularity is normal size. Right shoulder arthroplasty. Monitoring leads project across the thorax. Density in the lateral left lung base with a smooth margin, this may be due to fissure or this may be artifact from a skin fold. If the patient is clinically able recommend repeat study otherwise this may represent atelectasis, consolidation  or skinfold. Assessment and  plan:   1. Acute diastolic congestive heart failure   2. Shortness of breath and hypoxia secondary to above  3. left lung base atelectasis versus consolidation    Patient is currently on 3 lit O2 via nasal cannula saturation 98% she is not complaining of shortness of breath at rest.  She does not have any cough or sputum production. chest x-ray showed left basilar atelectasis, consolidation or skinfold. I would repeat chest x-ray PA and lateral in a.m. continue bronchodilator with DuoNeb every 4 hours when necessary and albuterol every 2 hours when necessary. She is on empirically Rocephin 1 g every 24 hourly. Chest x-ray shows no definite infiltration than antibiotic and be discontinued.   Discuss with daughter at bedside    Thank you for this consultation    Electronically signed by Linda Calderon MD, [facility] on 2/13/2018 at 8:00 PM

## 2018-02-14 NOTE — PROGRESS NOTES
[facility] OCCUPATIONAL THERAPY EVALUATION - ACUTE     Date: 2018  Patient Name: Wellington Lee        MRN: [id number]  Account: [de-identified]   : 1921  (80 y.o.)  [facility]: [id number]    Chart Review:  Diagnosis:  The primary encounter diagnosis was Acute congestive heart failure, unspecified congestive heart failure type ([address] A diagnosis of Hypoxia was also pertinent to this visit. Past Medical History:   Diagnosis Date    Arthritis     Glaucoma     Hyperlipidemia     Hypertension     Macula lutea degeneration      Past Surgical History:   Procedure Laterality Date    APPENDECTOMY      HYSTERECTOMY      JOINT REPLACEMENT      SHOULDER SURGERY       Precautions:  Fall   Restrictions/Precautions: Fall Risk    Evaluation and Pt. rights have been reviewed: [x]Yes   [] No   If no why not:   Falls safety interventions in place  [x]Yes   [] No    Comments:     Subjective: Daughter Elizabeth in room. Prior living arrangement:      Support contact: Family     Pt lives: [] Alone   [] With spouse   [x] Other   Comment:  sister  Home: [] Single level   [x]  Two level   []  Split level     []  Apartment:     Entrance:  Stairs: 1 Hand rails,    Inside: Stairs: 2 flights  Hand rails: both   Bathroom: [] Bath tub   [] Tub/Shower combo   [x]  Shower stall    Location: 2nd floor   DME: [x] W/W   [] [employer]   [] Rollator   []  W/C   [x] [employer]   [x] Shower Chair   [] [facility]  [] Dressing  AE  [] Other:      Previous Functional Status:  Reported IND with ADL's, pt was not driving, former daughter in law cleans house and sister does some cooking PTA. Daughter reported pt will be moving in with her in the near future. Pain:   Start of session: 0/10  Description:   Location:   End of session: 0/10  Action: [x] No Action Necessary    [] Patient reports pain at acceptable level for treatment  [] Nursing notified    [] Other      Objective:  Observation:  Pt lying in bed with head elevated.  Pt with thoracic kyphosis. Orientation: Oriented to  [x] Person   [x] Place  [x]Time    Vision:   []  WFL   [x] Impaired  Comments: glasses       Hearing:  [] WFL   [x] Impaired  Comments: [place] no hearing aids     Sensation:   [x] WFL   []  Impaired   Comments:      Cognition:   [x] WFL   [] Impaired  Comments:    Communication:   [x] WFL   [] Impaired  Comments:    Hand Dominance: [x] Right   [] Left    Range of Motion:  R UE AROM/PROM: []  WFL [x] Impaired  Comments: less than 90° shoulder flexion   L UE AROM/PROM:  [x]  WFL [] Impaired  Comments:     Strength:   R UE Strength: []1    [] 2   [] 2+   [] 3   [] 3+   [x] 4   [] 4+  [] 5  Comments: grossly assessed bilaterally    L UE Strength:  []1    [] 2   [] 2+   [] 3   [] 3+  [x] 4   [] 4+   [] 5  Comments:     Quality of Movement:  [x] Good   [] Fair   [] Poor     Coordination:  Gross motor: [x] WFL   [] Impaired   Fine motor: [x] WFL   [] Impaired     Functional Mobility:  Toilet Transfers:  Anticipated CGA observed sitting EOB, pt with rapid transition stand to sit with posterior lean    Bed Transfer: Supervised supine to sit with bed flat. SBA sit to supine with use of hand rails      Sit to stand: SBA  Bed to Sink: SBA    Seated Balance:      Static: [x] Good  [] Fair   [] Poor   Dynamic: [x]  Good  [] Fair   [] Poor     Standing Balance:     Static: [] Good   [x] Fair+  [] Poor   Dynamic: [] Good   [x] Fair   [] Poor     Functional Endurance: [] Good  [x] Fair  [] Poor  Pt with increased rate of respiration after walking to sink and back to bed.      ADLs  Feeding:  Positive hand to mouth   UE Dressing:  Set up   LB Dressing:  Supervised sitting EOB to don/doff socks   Bathing:  Anticipated supervised   Toileting: Simulated SBA   Grooming: Set up standing at sink     Treatment Plan will consist of:   [x] ADL Training   [] Strengthening   [x] Endurance   [] Transfer Training   []  DME ed      [] HEP  [] Manual Therapy   [] AROM/PROM    [] Coordination   [] Cognitive Training   []Safety training   [] Other :    Goals:   Patient will:   [x]  Improve functional endurance to tolerate/complete 8-15 mins of ADL's   [x]  Be IND in UB ADLs    [x]  Be IND in LB ADLs   [x]  Be Mod IND in ADL transfers without LOB   [x]  Be Mod IND in toileting tasks   [x]  Improve B hand fine motor coordination to [facility] in order to manage clothing fasteners/self-care containers in a timely manner   []  Improve  UE Function (AROM, strength, motor control, tone normalization) to complete ADLs as projected. [x]  Improve B UE strength and endurance to [facility] in order to participate in self-care activities as projected. [x]  Access appropriate D/C site with as few architectural barriers as possible.   []  Sequence self-care tasks with    []  Other :      Patient Goal: To go home. Discussed and agreed upon: [x] Yes   [] No         Comments:     Assessment/Discharge Disposition:     Performance deficits / Impairments: Decreased functional mobility , Decreased ADL status, Decreased ROM, Decreased strength, Decreased balance, Decreased endurance  Prognosis: Good  Discharge Recommendations: Continue to assess pending progress  History: Multi comorb  Exam: 6 perf imp   Assistance / Modification: SBA/CGA    Prognosis:  [x] Good   []Fair   [] Poor     Barriers to Improvement:  Decreased endurance     Recommended DME:  [] W/W   [] Marjorie Isaacs   [] Rollator   [] W/C   [] Jovanni Rogel  [] Shower Chair   []Dressing AD []  BSC  [] Other:    Plan:Times per week: 1-3,      G-Codes:  OT G-codes  Functional Limitation: Self care  Self Care Current Status (): At least 20 percent but less than 40 percent impaired, limited or restricted  Self Care Goal Status (): At least 1 percent but less than 20 percent impaired, limited or restricted    Time in:  11:24  Time out:  11:45  Timed treatment minutes:  19  Total treatment time/minutes:  26    Electronically signed by:     Karrie Gill OT  2/14/2018, 11:52 AM

## 2018-02-15 LAB — URINE CULTURE, ROUTINE: NORMAL

## 2018-02-15 PROCEDURE — 93010 ELECTROCARDIOGRAM REPORT: CPT | Performed by: INTERNAL MEDICINE

## 2018-02-15 NOTE — PROGRESS NOTES
Physical Therapy  Facility/Department: [facility] [id number]  Physical Therapy Discharge      NAME: Cuong Espino    : 1921 (80 y.o.)  MRN: [id number]    Account: [de-identified]  Gender: female      Patient has been discharged from acute care hospital. DC patient from current PT program.      Electronically signed by Yadira Victor PT on 2/15/18 at 4:25 PM

## 2018-02-17 LAB
BLOOD CULTURE, ROUTINE: NORMAL
CULTURE, BLOOD 2: NORMAL

## 2018-03-06 ENCOUNTER — OFFICE VISIT (OUTPATIENT)
Dept: CARDIOLOGY CLINIC | Age: 83
End: 2018-03-06
Payer: MEDICARE

## 2018-03-06 VITALS
HEIGHT: 64 IN | DIASTOLIC BLOOD PRESSURE: 78 MMHG | WEIGHT: 157 LBS | BODY MASS INDEX: 26.8 KG/M2 | HEART RATE: 73 BPM | RESPIRATION RATE: 16 BRPM | OXYGEN SATURATION: 96 % | SYSTOLIC BLOOD PRESSURE: 126 MMHG

## 2018-03-06 DIAGNOSIS — I50.31 ACUTE DIASTOLIC CONGESTIVE HEART FAILURE (HCC): ICD-10-CM

## 2018-03-06 DIAGNOSIS — R06.02 SHORTNESS OF BREATH: ICD-10-CM

## 2018-03-06 DIAGNOSIS — I50.9 ACUTE CONGESTIVE HEART FAILURE, UNSPECIFIED CONGESTIVE HEART FAILURE TYPE: ICD-10-CM

## 2018-03-06 DIAGNOSIS — I10 HYPERTENSION, UNSPECIFIED TYPE: ICD-10-CM

## 2018-03-06 LAB
ANION GAP SERPL CALCULATED.3IONS-SCNC: 17 MEQ/L (ref 7–13)
BUN BLDV-MCNC: 31 MG/DL (ref 8–23)
CALCIUM SERPL-MCNC: 9.1 MG/DL (ref 8.6–10.2)
CHLORIDE BLD-SCNC: 97 MEQ/L (ref 98–107)
CO2: 22 MEQ/L (ref 22–29)
CREAT SERPL-MCNC: 1.3 MG/DL (ref 0.5–0.9)
GFR AFRICAN AMERICAN: 45.9
GFR NON-AFRICAN AMERICAN: 37.9
GLUCOSE BLD-MCNC: 89 MG/DL (ref 74–109)
POTASSIUM SERPL-SCNC: 5.5 MEQ/L (ref 3.5–5.1)
PRO-BNP: 1209 PG/ML
SODIUM BLD-SCNC: 136 MEQ/L (ref 132–144)

## 2018-03-06 PROCEDURE — 1036F TOBACCO NON-USER: CPT | Performed by: INTERNAL MEDICINE

## 2018-03-06 PROCEDURE — 99213 OFFICE O/P EST LOW 20 MIN: CPT | Performed by: INTERNAL MEDICINE

## 2018-03-06 PROCEDURE — 1111F DSCHRG MED/CURRENT MED MERGE: CPT | Performed by: INTERNAL MEDICINE

## 2018-03-06 PROCEDURE — 1123F ACP DISCUSS/DSCN MKR DOCD: CPT | Performed by: INTERNAL MEDICINE

## 2018-03-06 PROCEDURE — G8484 FLU IMMUNIZE NO ADMIN: HCPCS | Performed by: INTERNAL MEDICINE

## 2018-03-06 PROCEDURE — 4040F PNEUMOC VAC/ADMIN/RCVD: CPT | Performed by: INTERNAL MEDICINE

## 2018-03-06 PROCEDURE — 1090F PRES/ABSN URINE INCON ASSESS: CPT | Performed by: INTERNAL MEDICINE

## 2018-03-06 PROCEDURE — G8419 CALC BMI OUT NRM PARAM NOF/U: HCPCS | Performed by: INTERNAL MEDICINE

## 2018-03-06 PROCEDURE — G8427 DOCREV CUR MEDS BY ELIG CLIN: HCPCS | Performed by: INTERNAL MEDICINE

## 2018-03-06 RX ORDER — FUROSEMIDE 20 MG/1
40 TABLET ORAL DAILY
Qty: 60 TABLET | Refills: 3 | Status: SHIPPED | OUTPATIENT
Start: 2018-03-06 | End: 2018-04-10 | Stop reason: SDUPTHER

## 2018-03-30 ASSESSMENT — ENCOUNTER SYMPTOMS
ABDOMINAL PAIN: 0
COLOR CHANGE: 0
APNEA: 0
CHEST TIGHTNESS: 0
ABDOMINAL DISTENTION: 0
CHOKING: 0
SHORTNESS OF BREATH: 1
BACK PAIN: 0

## 2018-03-30 NOTE — PROGRESS NOTES
[facility] CARDIOLOGY OFFICE FOLLOW-UP      Patient: Mary Vizcaino  YOB: 1921  MRN: [id number]    Chief Complaint:  Chief Complaint   Patient presents with    Shortness of [address] [facility] 2/12-14/2018       Subjective/HPI    Patient seen in hospital for dyspnea and shortness of breath. Known COPD. Felt to have diastolic CHF as well. ECHO showed EF 50%, impaired diastolic relaxation. Continues to have shortness of breath. EKG:LBBB    Past Medical History:   Diagnosis Date    Arthritis     Glaucoma     Hyperlipidemia     Hypertension     Macula lutea degeneration        Past Surgical History:   Procedure Laterality Date    APPENDECTOMY      HYSTERECTOMY      JOINT REPLACEMENT      SHOULDER SURGERY  2013       No family history on file. Social History     Social History    Marital status:       Spouse name: N/A    Number of children: N/A    Years of education: N/A     Social History Main Topics    Smoking status: Never Smoker    Smokeless tobacco: Never Used    Alcohol use No    Drug use: No    Sexual activity: Not Asked     Other Topics Concern    None     Social History Narrative    None       Allergies   Allergen Reactions    Morphine      Other reaction(s): Mental Status Change    Oxycodone-Acetaminophen      Other reaction(s): Mental Status Change    Sulfa Antibiotics Other (See Comments)     UNKNOWN    Tramadol Hcl      Other reaction(s): Mental Status Change       Current Outpatient Prescriptions   Medication Sig Dispense Refill    furosemide (LASIX) 20 MG tablet Take 2 tablets by mouth daily 60 tablet 3    carvedilol (COREG) 12.5 MG tablet Take 12.5 mg by mouth 2 times daily (with meals)      lisinopril (PRINIVIL;ZESTRIL) 10 MG tablet Take 10 mg by mouth daily      latanoprost (XALATAN) 0.005 % ophthalmic solution Place 1 drop into both eyes nightly      brimonidine (ALPHAGAN) 0.2 % ophthalmic solution Place 1 drop into both eyes 2 times daily

## 2018-04-10 ENCOUNTER — OFFICE VISIT (OUTPATIENT)
Dept: CARDIOLOGY CLINIC | Age: 83
End: 2018-04-10
Payer: MEDICARE

## 2018-04-10 VITALS
OXYGEN SATURATION: 96 % | SYSTOLIC BLOOD PRESSURE: 104 MMHG | RESPIRATION RATE: 12 BRPM | HEART RATE: 74 BPM | DIASTOLIC BLOOD PRESSURE: 66 MMHG

## 2018-04-10 DIAGNOSIS — I50.31 ACUTE DIASTOLIC CONGESTIVE HEART FAILURE (HCC): Primary | ICD-10-CM

## 2018-04-10 DIAGNOSIS — I10 HYPERTENSION, UNSPECIFIED TYPE: ICD-10-CM

## 2018-04-10 DIAGNOSIS — R06.02 SHORTNESS OF BREATH: ICD-10-CM

## 2018-04-10 PROCEDURE — 99213 OFFICE O/P EST LOW 20 MIN: CPT | Performed by: INTERNAL MEDICINE

## 2018-04-10 PROCEDURE — 1123F ACP DISCUSS/DSCN MKR DOCD: CPT | Performed by: INTERNAL MEDICINE

## 2018-04-10 PROCEDURE — G8419 CALC BMI OUT NRM PARAM NOF/U: HCPCS | Performed by: INTERNAL MEDICINE

## 2018-04-10 PROCEDURE — 1090F PRES/ABSN URINE INCON ASSESS: CPT | Performed by: INTERNAL MEDICINE

## 2018-04-10 PROCEDURE — 1036F TOBACCO NON-USER: CPT | Performed by: INTERNAL MEDICINE

## 2018-04-10 PROCEDURE — 4040F PNEUMOC VAC/ADMIN/RCVD: CPT | Performed by: INTERNAL MEDICINE

## 2018-04-10 PROCEDURE — G8427 DOCREV CUR MEDS BY ELIG CLIN: HCPCS | Performed by: INTERNAL MEDICINE

## 2018-04-10 RX ORDER — CARVEDILOL 12.5 MG/1
12.5 TABLET ORAL 2 TIMES DAILY WITH MEALS
Qty: 60 TABLET | Refills: 5 | Status: SHIPPED | OUTPATIENT
Start: 2018-04-10

## 2018-04-10 RX ORDER — FUROSEMIDE 20 MG/1
40 TABLET ORAL DAILY
Qty: 60 TABLET | Refills: 3 | Status: SHIPPED | OUTPATIENT
Start: 2018-04-10 | End: 2018-11-08 | Stop reason: SDUPTHER

## 2018-04-10 RX ORDER — LISINOPRIL 10 MG/1
10 TABLET ORAL DAILY
Qty: 30 TABLET | Refills: 5 | Status: SHIPPED | OUTPATIENT
Start: 2018-04-10 | End: 2018-07-10 | Stop reason: ALTCHOICE

## 2018-04-10 ASSESSMENT — ENCOUNTER SYMPTOMS
CHEST TIGHTNESS: 0
APNEA: 0
ABDOMINAL PAIN: 0
COLOR CHANGE: 0
BACK PAIN: 0
ABDOMINAL DISTENTION: 0
CHOKING: 0

## 2018-07-10 ENCOUNTER — OFFICE VISIT (OUTPATIENT)
Dept: CARDIOLOGY CLINIC | Age: 83
End: 2018-07-10
Payer: MEDICARE

## 2018-07-10 VITALS
WEIGHT: 160 LBS | DIASTOLIC BLOOD PRESSURE: 59 MMHG | BODY MASS INDEX: 27.31 KG/M2 | HEIGHT: 64 IN | HEART RATE: 72 BPM | OXYGEN SATURATION: 92 % | SYSTOLIC BLOOD PRESSURE: 103 MMHG | RESPIRATION RATE: 16 BRPM

## 2018-07-10 DIAGNOSIS — I10 HYPERTENSION, UNSPECIFIED TYPE: ICD-10-CM

## 2018-07-10 DIAGNOSIS — I50.9 ACUTE CONGESTIVE HEART FAILURE, UNSPECIFIED CONGESTIVE HEART FAILURE TYPE: ICD-10-CM

## 2018-07-10 DIAGNOSIS — I50.31 ACUTE DIASTOLIC CONGESTIVE HEART FAILURE (HCC): ICD-10-CM

## 2018-07-10 DIAGNOSIS — R06.02 SHORTNESS OF BREATH: ICD-10-CM

## 2018-07-10 PROCEDURE — G8419 CALC BMI OUT NRM PARAM NOF/U: HCPCS | Performed by: INTERNAL MEDICINE

## 2018-07-10 PROCEDURE — 1123F ACP DISCUSS/DSCN MKR DOCD: CPT | Performed by: INTERNAL MEDICINE

## 2018-07-10 PROCEDURE — 1101F PT FALLS ASSESS-DOCD LE1/YR: CPT | Performed by: INTERNAL MEDICINE

## 2018-07-10 PROCEDURE — 99213 OFFICE O/P EST LOW 20 MIN: CPT | Performed by: INTERNAL MEDICINE

## 2018-07-10 PROCEDURE — 4040F PNEUMOC VAC/ADMIN/RCVD: CPT | Performed by: INTERNAL MEDICINE

## 2018-07-10 PROCEDURE — G8427 DOCREV CUR MEDS BY ELIG CLIN: HCPCS | Performed by: INTERNAL MEDICINE

## 2018-07-10 PROCEDURE — 1036F TOBACCO NON-USER: CPT | Performed by: INTERNAL MEDICINE

## 2018-07-10 PROCEDURE — 1090F PRES/ABSN URINE INCON ASSESS: CPT | Performed by: INTERNAL MEDICINE

## 2018-07-10 RX ORDER — LISINOPRIL 5 MG/1
5 TABLET ORAL DAILY
Qty: 30 TABLET | Refills: 3 | Status: SHIPPED | OUTPATIENT
Start: 2018-07-10

## 2018-07-10 NOTE — PROGRESS NOTES
[facility] CARDIOLOGY OFFICE FOLLOW-UP      Patient: James James  YOB: 1921  MRN: [id number]    Chief Complaint:  Chief Complaint   Patient presents with    Congestive Heart Failure     3 m f/u    Shortness of Breath    Hypertension       Subjective/HPI    The patient is followed in the office chronically for the following problems: diastolic CHF    The last office visit focused on the following: symptom assessment    Since the last office visit, the patient has not gotten pulmonary eval and does not want to go. Having some dyspnea but not too bad, wants to continue current therapy. BP low today but not lightheaded or dizzy. Past Medical History:   Diagnosis Date    Arthritis     Glaucoma     Hyperlipidemia     Hypertension     Macula lutea degeneration        Past Surgical History:   Procedure Laterality Date    APPENDECTOMY      HYSTERECTOMY      JOINT REPLACEMENT      SHOULDER SURGERY  2013       No family history on file. Social History     Social History    Marital status:       Spouse name: N/A    Number of children: N/A    Years of education: N/A     Social History Main Topics    Smoking status: Never Smoker    Smokeless tobacco: Never Used    Alcohol use No    Drug use: No    Sexual activity: Not Asked     Other Topics Concern    None     Social History Narrative    None       Allergies   Allergen Reactions    Morphine      Other reaction(s): Mental Status Change    Oxycodone-Acetaminophen      Other reaction(s): Mental Status Change    Sulfa Antibiotics Other (See Comments)     UNKNOWN    Tramadol Hcl      Other reaction(s): Mental Status Change       Current Outpatient Prescriptions   Medication Sig Dispense Refill    lisinopril (PRINIVIL;ZESTRIL) 5 MG tablet Take 1 tablet by mouth daily 30 tablet 3    furosemide (LASIX) 20 MG tablet Take 2 tablets by mouth daily 60 tablet 3    carvedilol (COREG) 12.5 MG tablet Take 1 tablet by mouth 2 times

## 2018-11-08 RX ORDER — FUROSEMIDE 20 MG/1
40 TABLET ORAL DAILY
Qty: 60 TABLET | Refills: 3 | Status: SHIPPED | OUTPATIENT
Start: 2018-11-08 | End: 2019-03-27 | Stop reason: SDUPTHER

## 2019-01-07 RX ORDER — FUROSEMIDE 20 MG/1
TABLET ORAL
Qty: 60 TABLET | Refills: 5 | Status: SHIPPED | OUTPATIENT
Start: 2019-01-07 | End: 2019-01-28 | Stop reason: SDUPTHER

## 2019-01-11 RX ORDER — LISINOPRIL 10 MG/1
10 TABLET ORAL DAILY
Qty: 30 TABLET | Refills: 0 | Status: SHIPPED | OUTPATIENT
Start: 2019-01-11

## 2019-01-28 RX ORDER — FUROSEMIDE 20 MG/1
TABLET ORAL
Qty: 60 TABLET | Refills: 5 | Status: SHIPPED | OUTPATIENT
Start: 2019-01-28

## 2019-03-27 ENCOUNTER — OFFICE VISIT (OUTPATIENT)
Dept: CARDIOLOGY CLINIC | Age: 84
End: 2019-03-27
Payer: MEDICARE

## 2019-03-27 VITALS
HEART RATE: 51 BPM | WEIGHT: 161 LBS | HEIGHT: 64 IN | BODY MASS INDEX: 27.49 KG/M2 | DIASTOLIC BLOOD PRESSURE: 80 MMHG | OXYGEN SATURATION: 97 % | SYSTOLIC BLOOD PRESSURE: 118 MMHG | RESPIRATION RATE: 16 BRPM

## 2019-03-27 DIAGNOSIS — I10 HYPERTENSION, UNSPECIFIED TYPE: Primary | ICD-10-CM

## 2019-03-27 DIAGNOSIS — I50.9 ACUTE CONGESTIVE HEART FAILURE, UNSPECIFIED HEART FAILURE TYPE (HCC): ICD-10-CM

## 2019-03-27 DIAGNOSIS — I50.31 ACUTE DIASTOLIC CONGESTIVE HEART FAILURE (HCC): ICD-10-CM

## 2019-03-27 DIAGNOSIS — R06.02 SHORTNESS OF BREATH: ICD-10-CM

## 2019-03-27 PROCEDURE — 1123F ACP DISCUSS/DSCN MKR DOCD: CPT | Performed by: INTERNAL MEDICINE

## 2019-03-27 PROCEDURE — 4040F PNEUMOC VAC/ADMIN/RCVD: CPT | Performed by: INTERNAL MEDICINE

## 2019-03-27 PROCEDURE — 99213 OFFICE O/P EST LOW 20 MIN: CPT | Performed by: INTERNAL MEDICINE

## 2019-03-27 PROCEDURE — G8484 FLU IMMUNIZE NO ADMIN: HCPCS | Performed by: INTERNAL MEDICINE

## 2019-03-27 PROCEDURE — G8427 DOCREV CUR MEDS BY ELIG CLIN: HCPCS | Performed by: INTERNAL MEDICINE

## 2019-03-27 PROCEDURE — 1036F TOBACCO NON-USER: CPT | Performed by: INTERNAL MEDICINE

## 2019-03-27 PROCEDURE — G8419 CALC BMI OUT NRM PARAM NOF/U: HCPCS | Performed by: INTERNAL MEDICINE

## 2019-03-27 PROCEDURE — 1090F PRES/ABSN URINE INCON ASSESS: CPT | Performed by: INTERNAL MEDICINE

## 2019-03-27 PROCEDURE — 93000 ELECTROCARDIOGRAM COMPLETE: CPT | Performed by: INTERNAL MEDICINE

## 2019-03-27 NOTE — PROGRESS NOTES
[facility] CARDIOLOGY OFFICE FOLLOW-UP      Patient: Michael Rodriguez  YOB: 1921  MRN: [id number]    Chief Complaint:  Chief Complaint   Patient presents with    Hypertension    Congestive Heart Failure    Shortness of Breath       Subjective/HPI    The patient is followed in the cardiology office chronically for the following problems: Diastolic CHF    The last encounter focused on the following: followup    Testing/hospitalizations/procedures performed since last encounter: none    Since the last encounter, the patient has not had any shortness of breath or swelling. No new symptoms/events. Past Medical History:   Diagnosis Date    Arthritis     Glaucoma     Hyperlipidemia     Hypertension     Macula lutea degeneration        Past Surgical History:   Procedure Laterality Date    APPENDECTOMY      HYSTERECTOMY      JOINT REPLACEMENT      SHOULDER SURGERY  2013       History reviewed. No pertinent family history. Social History     Socioeconomic History    Marital status:       Spouse name: None    Number of children: None    Years of education: None    Highest education level: None   Occupational History    None   Social Needs    Financial resource strain: None    Food insecurity:     Worry: None     Inability: None    Transportation needs:     Medical: None     Non-medical: None   Tobacco Use    Smoking status: Never Smoker    Smokeless tobacco: Never Used   Substance and Sexual Activity    Alcohol use: No    Drug use: No    Sexual activity: None   Lifestyle    Physical activity:     Days per week: None     Minutes per session: None    Stress: None   Relationships    Social connections:     Talks on phone: None     Gets together: None     Attends Rastafari service: None     Active member of club or organization: None     Attends meetings of clubs or organizations: None     Relationship status: None    Intimate partner violence:     Fear of current or ex partner: None     Emotionally abused: None     Physically abused: None     Forced sexual activity: None   Other Topics Concern    None   Social History Narrative    None       Allergies   Allergen Reactions    Morphine      Other reaction(s): Mental Status Change    Oxycodone-Acetaminophen      Other reaction(s): Mental Status Change    Sulfa Antibiotics Other (See Comments)     UNKNOWN    Tramadol Hcl      Other reaction(s): Mental Status Change       Current Outpatient Medications   Medication Sig Dispense Refill    furosemide (LASIX) 20 MG tablet TAKE 1 TABLET BY MOUTH TWICE DAILY 60 tablet 5    lisinopril (PRINIVIL;ZESTRIL) 10 MG tablet TAKE 1 TABLET BY MOUTH DAILY 30 tablet 0    lisinopril (PRINIVIL;ZESTRIL) 5 MG tablet Take 1 tablet by mouth daily 30 tablet 3    carvedilol (COREG) 12.5 MG tablet Take 1 tablet by mouth 2 times daily (with meals) 60 tablet 5    latanoprost (XALATAN) 0.005 % ophthalmic solution Place 1 drop into both eyes nightly      brimonidine (ALPHAGAN) 0.2 % ophthalmic solution Place 1 drop into both eyes 2 times daily       No current facility-administered medications for this visit. Review of Systems:   Review of Systems   Constitutional: Negative for activity change and appetite change. HENT: Negative for congestion. Respiratory: Negative for apnea, choking and chest tightness. Cardiovascular: Negative for chest pain. Gastrointestinal: Negative for abdominal distention and abdominal pain. Endocrine: Negative for cold intolerance and heat intolerance. Genitourinary: Negative for dysuria and enuresis. Musculoskeletal: Negative for arthralgias and back pain. Skin: Negative for color change. Allergic/Immunologic: Negative. Neurological: Negative for dizziness, seizures, syncope and light-headedness. Psychiatric/Behavioral: Negative for agitation, behavioral problems and confusion.        Physical Examination:    /80 (Site: Left Upper Arm, Position: Sitting, Cuff Size: Medium Adult)   Pulse 51   Resp 16   Ht 5' 4\" (1.626 m)   Wt 161 lb (73 kg)   SpO2 97%   BMI 27.64 kg/m²    Physical Exam   Constitutional: The patient appears healthy. No distress. HENT: Mouth/Throat: Oropharynx is clear. Eyes: Pupils are equal, round, and reactive to light. Neck: Normal range of motion. No JVD present. Cardiovascular: Regular rhythm, S1 normal, S2 normal, normal heart sounds and intact distal pulses. Exam reveals no gallop. No murmur heard. Pulses:       Radial pulses are 2+ on the right side. Dorsalis pedis pulses are 2+ on the right side. Pulmonary/Chest: Effort normal and breath sounds normal. No wheezes. No rales. No tenderness. Abdominal: Soft. Bowel sounds are normal.   Musculoskeletal: Normal range of motion. No edema. Neurological: The patient is alert and oriented to person, place, and time. Intact cranial nerves. Skin: Skin is warm and dry. No rash noted.        LABS:  CBC:   Lab Results   Component Value Date    WBC 5.4 02/13/2018    RBC 4.49 02/13/2018    HGB 13.6 02/13/2018    HCT 41.5 02/13/2018    MCV 92.6 02/13/2018    MCH 30.3 02/13/2018    MCHC 32.8 02/13/2018    RDW 13.7 02/13/2018     02/13/2018    MPV 8.0 10/30/2013     Lipids:  Lab Results   Component Value Date    CHOL 154 08/11/2013     Lab Results   Component Value Date    TRIG 98 08/11/2013     Lab Results   Component Value Date    HDL 34 (L) 08/11/2013     Lab Results   Component Value Date    LDLCALC 100 08/11/2013     No results found for: LABVLDL, VLDL  No results found for: CHOLHDLRATIO  CMP:    Lab Results   Component Value Date     03/06/2018    K 5.5 03/06/2018    K 5.3 02/13/2018    CL 97 03/06/2018    CO2 22 03/06/2018    BUN 31 03/06/2018    CREATININE 1.30 03/06/2018    GFRAA 45.9 03/06/2018    LABGLOM 37.9 03/06/2018    GLUCOSE 89 03/06/2018    PROT 6.8 02/13/2018    LABALBU 3.7 02/13/2018    CALCIUM 9.1 03/06/2018    BILITOT 0.4 02/13/2018    ALKPHOS 78 02/13/2018    AST 19 02/13/2018    ALT 15 02/13/2018     BMP:    Lab Results   Component Value Date     03/06/2018    K 5.5 03/06/2018    K 5.3 02/13/2018    CL 97 03/06/2018    CO2 22 03/06/2018    BUN 31 03/06/2018    LABALBU 3.7 02/13/2018    CREATININE 1.30 03/06/2018    CALCIUM 9.1 03/06/2018    GFRAA 45.9 03/06/2018    LABGLOM 37.9 03/06/2018    GLUCOSE 89 03/06/2018     Magnesium:    Lab Results   Component Value Date    MG 2.3 02/14/2018     TSH:  Lab Results   Component Value Date    TSH 4.243 08/11/2013       Patient Active Problem List   Diagnosis    COPD with acute exacerbation (HCC)    HTN (hypertension)    Acute diastolic congestive heart failure (HCC)    Acute congestive heart failure (HCC)    Shortness of breath    Hypoxia       Medications:  Current Outpatient Medications   Medication Sig Dispense Refill    furosemide (LASIX) 20 MG tablet TAKE 1 TABLET BY MOUTH TWICE DAILY 60 tablet 5    lisinopril (PRINIVIL;ZESTRIL) 10 MG tablet TAKE 1 TABLET BY MOUTH DAILY 30 tablet 0    lisinopril (PRINIVIL;ZESTRIL) 5 MG tablet Take 1 tablet by mouth daily 30 tablet 3    carvedilol (COREG) 12.5 MG tablet Take 1 tablet by mouth 2 times daily (with meals) 60 tablet 5    latanoprost (XALATAN) 0.005 % ophthalmic solution Place 1 drop into both eyes nightly      brimonidine (ALPHAGAN) 0.2 % ophthalmic solution Place 1 drop into both eyes 2 times daily       No current facility-administered medications for this visit. Assessment/Plan:    1. Hypertension, unspecified type  Patient has essential hypertension on BP medications. The guideline-directed target for BP in this patient is <130/80. In order to reach our target BP we will continue current BP medications, increasing the dose of current meds or adding new to reach target. - EKG 12 Lead    2. Acute diastolic congestive heart failure (HCC)  Compensated class II  - EKG 12 Lead    3.  Acute congestive heart failure, unspecified heart failure type ([address]    - EKG 12 Lead    4. Shortness of breath    - EKG 12 Lead       Counseling: the patient was counseled regarding diet, exercise, weight control and heart healthy lifestyle. No follow-ups on file. Electronically signed by   Angely Dang.  Savita Connor MD [facility] Director of Cardiology Services and Cardiac Catheterization Laboratory  [facility], [place]    on 3/27/2019 at 11:26 AM

## 2021-08-28 ENCOUNTER — TELEPHONE ENCOUNTER (OUTPATIENT)
Dept: URBAN - METROPOLITAN AREA CLINIC 13 | Facility: CLINIC | Age: 86
End: 2021-08-28

## 2021-08-29 ENCOUNTER — TELEPHONE ENCOUNTER (OUTPATIENT)
Dept: URBAN - METROPOLITAN AREA CLINIC 13 | Facility: CLINIC | Age: 86
End: 2021-08-29